# Patient Record
(demographics unavailable — no encounter records)

---

## 2024-10-13 NOTE — HISTORY OF PRESENT ILLNESS
[FreeTextEntry1] : Establish care [de-identified] : Ms. IOANA MCPHERSON is a 62-year female with a PMH of DM (diet controlled, Obesity s/p bariatric surgery with significant weight loss, GERD and Anemia who presents to establish care due to [facility] of previous PCP from the practice. Patient reports since attending  over the weekend, she has been experiencing cold symptoms.

## 2024-10-13 NOTE — PHYSICAL EXAM
[Supple] : supple [No Edema] : there was no peripheral edema [Normal] : no rash [Coordination Grossly Intact] : coordination grossly intact [No Focal Deficits] : no focal deficits [Normal Gait] : normal gait [Normal Affect] : the affect was normal [Normal Insight/Judgement] : insight and judgment were intact

## 2024-10-13 NOTE — HEALTH RISK ASSESSMENT
[Good] : ~his/her~  mood as  good [Yes] : Yes [Monthly or less (1 pt)] : Monthly or less (1 point) [1 or 2 (0 pts)] : 1 or 2 (0 points) [Never (0 pts)] : Never (0 points) [No] : In the past 12 months have you used drugs other than those required for medical reasons? No [0] : 2) Feeling down, depressed, or hopeless: Not at all (0) [PHQ-2 Negative - No further assessment needed] : PHQ-2 Negative - No further assessment needed [Fully functional (bathing, dressing, toileting, transferring, walking, feeding)] : Fully functional (bathing, dressing, toileting, transferring, walking, feeding) [Fully functional (using the telephone, shopping, preparing meals, housekeeping, doing laundry, using] : Fully functional and needs no help or supervision to perform IADLs (using the telephone, shopping, preparing meals, housekeeping, doing laundry, using transportation, managing medications and managing finances) [Never] : Never [Audit-CScore] : 1 [[id number]] : 0

## 2024-10-13 NOTE — HISTORY OF PRESENT ILLNESS
[FreeTextEntry1] : Establish care [de-identified] : Ms. IOANA MCPHERSON is a 62-year female with a PMH of DM (diet controlled, Obesity s/p bariatric surgery with significant weight loss, GERD and Anemia who presents to establish care due to [facility] of previous PCP from the practice. Patient reports since attending  over the weekend, she has been experiencing cold symptoms.

## 2024-10-13 NOTE — HEALTH RISK ASSESSMENT
[Good] : ~his/her~  mood as  good [Yes] : Yes [Monthly or less (1 pt)] : Monthly or less (1 point) [1 or 2 (0 pts)] : 1 or 2 (0 points) [Never (0 pts)] : Never (0 points) [No] : In the past 12 months have you used drugs other than those required for medical reasons? No [0] : 2) Feeling down, depressed, or hopeless: Not at all (0) [PHQ-2 Negative - No further assessment needed] : PHQ-2 Negative - No further assessment needed [Fully functional (bathing, dressing, toileting, transferring, walking, feeding)] : Fully functional (bathing, dressing, toileting, transferring, walking, feeding) [Fully functional (using the telephone, shopping, preparing meals, housekeeping, doing laundry, using] : Fully functional and needs no help or supervision to perform IADLs (using the telephone, shopping, preparing meals, housekeeping, doing laundry, using transportation, managing medications and managing finances) [Never] : Never [Audit-CScore] : 1 [[id number]] : 0

## 2024-10-20 NOTE — HEALTH RISK ASSESSMENT
[Good] : ~his/her~  mood as  good [MammogramDate] : 03/22 [ColonoscopyDate] : 05/22 [ColonoscopyComments] : Repeat 5 years

## 2024-10-20 NOTE — HISTORY OF PRESENT ILLNESS
[FreeTextEntry1] : CPE [de-identified] : Patient presents for CPE. We discussed results of recently done BW. A1c has increased. Patient accepted flu vaccine.

## 2024-10-20 NOTE — HISTORY OF PRESENT ILLNESS
[FreeTextEntry1] : CPE [de-identified] : Patient presents for CPE. We discussed results of recently done BW. A1c has increased. Patient accepted flu vaccine.

## 2024-12-09 NOTE — HISTORY OF PRESENT ILLNESS
[FreeTextEntry1] : F/U PT was seen at [facility] on 11/27/24 for dizziness and lightheadedness [de-identified] : Patient is following up after recent emergency room visit for dizziness and lightheadedness.  Patient reports that she was shopping in the grocery store the day before Thanksgiving and she went to reach for something, and she felt dizzy and lightheaded for a moment.  She subsequently proceeded to the emergency room where per patient EKG troponins chest x-ray were negative and orthostatic were negative as well.  Patient mentioned that it is possible that she was either dehydrated or perhaps her blood sugar went low that might have caused the dizziness and lightheadedness.  She has upcoming appointment to see her hematologist because it was noted that her hemoglobin had dropped from 11.4 last year to 9.  Patient would like to resume Ozempic for diabetes, she would also like renewal for pantoprazole and needs a glucometer as well.  She also plans to follow-up with her cardiologist in light of the recent emergency room visit.

## 2024-12-09 NOTE — PLAN
[FreeTextEntry1] : Patient advised that she will drop off a copy of the records from the emergency room for my review. Regarding anemia, she has a history of iron deficiency anemia that requires intermittent infusion, she will follow with hematology for follow-up. Restarted GLP-1 therapy for diabetes advised patient to check blood glucose fasting and whenever she feels dizzy.

## 2024-12-09 NOTE — PHYSICAL EXAM
[Supple] : supple [Normal] : normal rate, regular rhythm, normal S1 and S2 and no murmur heard [No Carotid Bruits] : no carotid bruits

## 2024-12-11 NOTE — ASSESSMENT
[FreeTextEntry1] : This 62-year-old woman is seen for recurrent iron deficiency anemia. History of gastric sleeve surgery.  She has follow-up by Dr. Marcus on Carafate and PPI for chronic GERD. Treated with IV FeraHeme x2 November 2018, June 2021. Treated with IV Venofer x5 March 2023.   Plan: - Likely secondary to poor absorption following bariatric surgery - Hgb 9.2 - Will schedule for IV iron. - Sending iron studies, B12/folate - Patient to follow up with GI for screening colonoscopy

## 2024-12-11 NOTE — HISTORY OF PRESENT ILLNESS
[de-identified] : This 62-year-old woman is seen for recurrent iron deficiency anemia. History of gastric sleeve surgery.  She has follow-up by Dr. Marcus on Carafate and PPI for chronic GERD. Treated with IV FeraHeme x2 November 2018, June 2021. Treated with IV Venofer x5 March 2023.         Interval History: Endorses fatigue, similar to when she is iron deficient. Hgb 9.2, HCT 31.3%, MCV 73.6.

## 2025-02-26 NOTE — HISTORY OF PRESENT ILLNESS
[de-identified] : This 63-year-old woman is seen for recurrent iron deficiency anemia. History of gastric sleeve surgery.  She has follow-up by Dr. Marcus on Carafate and PPI for chronic GERD. Treated with IV FeraHeme x2 November 2018, June 2021. Treated with IV Venofer x5 March 2023, x5 December 2024-February 2025.        Interval History: Feels well, no complaints of fatigue or pica. Denies any GI//mucosal bleeding.

## 2025-02-26 NOTE — ASSESSMENT
[FreeTextEntry1] : This 62-year-old woman is seen for recurrent iron deficiency anemia. History of gastric sleeve surgery.  She has follow-up by Dr. Marcus on Carafate and PPI for chronic GERD. Treated with IV FeraHeme x2 November 2018, June 2021. Treated with IV Venofer x5 March 2023, Venofer x5 December 2024-February 2025.    Plan: - Likely secondary to poor absorption following bariatric surgery - Hgb 11.9 - Sending iron studies, B12/folate - Patient to follow up with GI for screening colonoscopy  F/u 6 months

## 2025-04-14 NOTE — PLAN
[FreeTextEntry1] :     - Order all the test supplies again     - Increase Ozempic dosage to 1 mg     - Prescribe Macrobid for possible UTI, pending urine culture and sensitivity     - Follow-Up for test results  RTO 3 months, sooner if needed. Patient was allowed to ask questions. All questions, if any, were addressed appropriately with patient expressing understanding of any plan of care made.

## 2025-04-14 NOTE — PHYSICAL EXAM
[Normal] : no joint swelling and grossly normal strength and tone [No Focal Deficits] : no focal deficits [Coordination Grossly Intact] : coordination grossly intact [Normal Gait] : normal gait [Normal Affect] : the affect was normal [Normal Insight/Judgement] : insight and judgment were intact

## 2025-04-14 NOTE — HISTORY OF PRESENT ILLNESS
[FreeTextEntry1] :  IOANA is a 63-year-old female here for a follow up on Diabetes, obesity, and acid reflux [de-identified] : She mentions not having received diabetes test equipment and supplies. Patient showing willingness to improve her health as she has shown some weight loss and is discussing upping her dosage of ozempic to possibly achieve more weight loss and better diabetic control. She also mentioned having cloudy urine and is concerned about a UTI.

## 2025-04-14 NOTE — HISTORY OF PRESENT ILLNESS
[FreeTextEntry1] :  IOANA is a 63-year-old female here for a follow up on Diabetes, obesity, and acid reflux [de-identified] : She mentions not having received diabetes test equipment and supplies. Patient showing willingness to improve her health as she has shown some weight loss and is discussing upping her dosage of ozempic to possibly achieve more weight loss and better diabetic control. She also mentioned having cloudy urine and is concerned about a UTI.

## 2025-04-30 NOTE — HISTORY OF PRESENT ILLNESS
[FreeTextEntry1] : Medication follow up [de-identified] : Patient is following up to discuss Ozempic. Recent blood test shows A1c is not yet in the normal range and her weight loss has stagnated so in order to address we need to adjust medication dose.

## 2025-04-30 NOTE — PLAN
[FreeTextEntry1] : Increase Ozempic to 2mg weekly, monitor blood glucose and increase exercise. RTO 3 months or sooner as needed.

## 2025-07-11 NOTE — HISTORY OF PRESENT ILLNESS
[FreeTextEntry1] : Headaches, throat discomfort for past 1-2 weeks. [de-identified] : Patient presents to discuss recent symptoms of headache and throat discomfort. Patient initially believed it may be due to the Ozempic but upon further though she believes it might be due to fact that she is not eating as frequently with Ozempic and not drinking water as often. She also complains of some dizziness.  Regarding the throat discomfort, it is associated with nasal congestion. She also has GERD and does not take the PPI daily only on days she can anticipate a diet that may trigger symptoms.  She recently had Endoscopy with GI Dr. Marcus. She has cardiology and pulmonology appointments coming.